# Patient Record
Sex: MALE | Race: WHITE | Employment: OTHER | URBAN - METROPOLITAN AREA
[De-identification: names, ages, dates, MRNs, and addresses within clinical notes are randomized per-mention and may not be internally consistent; named-entity substitution may affect disease eponyms.]

---

## 2017-05-17 ENCOUNTER — TELEPHONE (OUTPATIENT)
Dept: SURGERY | Facility: CLINIC | Age: 62
End: 2017-05-17

## 2017-05-17 NOTE — TELEPHONE ENCOUNTER
Patient had hernia repair surgery with Dr. Gaviria in 2011.  Calling today c/o intermittent pain, described as hot and burning.  This has been sporadic for the past three weeks.  Denies any s/s of infection or strangulation.  He reports that there is a small bulge near the site, but that has been present since the surgery in 2011.  Patient will be set up to have a consult with Dr. Gaviria in the near future.  Patient will also go to ER if pain becomes worse or there is no relief from it.    Shanon Irizarry RN

## 2022-02-28 ENCOUNTER — OFFICE VISIT (OUTPATIENT)
Dept: SURGERY | Facility: CLINIC | Age: 67
End: 2022-02-28
Payer: MEDICARE

## 2022-02-28 VITALS
WEIGHT: 260 LBS | DIASTOLIC BLOOD PRESSURE: 78 MMHG | BODY MASS INDEX: 32.33 KG/M2 | SYSTOLIC BLOOD PRESSURE: 132 MMHG | HEIGHT: 75 IN

## 2022-02-28 DIAGNOSIS — R10.31 RLQ ABDOMINAL PAIN: ICD-10-CM

## 2022-02-28 DIAGNOSIS — R10.84 ABDOMINAL PAIN, GENERALIZED: Primary | ICD-10-CM

## 2022-02-28 PROCEDURE — 99204 OFFICE O/P NEW MOD 45 MIN: CPT | Performed by: SURGERY

## 2022-02-28 RX ORDER — IRON 18 MG
TABLET ORAL
COMMUNITY

## 2022-02-28 RX ORDER — VENLAFAXINE HYDROCHLORIDE 150 MG/1
150 TABLET, EXTENDED RELEASE ORAL DAILY
COMMUNITY

## 2022-02-28 RX ORDER — GABAPENTIN 100 MG/1
100 CAPSULE ORAL 3 TIMES DAILY
COMMUNITY

## 2022-02-28 RX ORDER — METOPROLOL SUCCINATE 25 MG/1
12.5 TABLET, EXTENDED RELEASE ORAL DAILY
COMMUNITY

## 2022-02-28 RX ORDER — DULAGLUTIDE 4.5 MG/.5ML
INJECTION, SOLUTION SUBCUTANEOUS
COMMUNITY

## 2022-02-28 NOTE — PROGRESS NOTES
"[facility]  Surgery Consultation    Primary care provider:  Viktoriya Rose [phone]    HPI: This patient is a 66-year-old gentleman known to me from prior laparoscopic incisional hernia repair in 2011 who presents out of concern for right lower quadrant abdominal pain.  He reports that in the interval since last being seen which is over a decade he has had intermittent episodes of right lower quadrant as well as left upper quadrant abdominal wall pains.  He reports that these oftentimes come on but they are relatively brief in nature and resolved without issue.  That said his most recent symptom began approximately 3 weeks ago and has been present basically all the time.  He states it does not change dramatically with activity.  There is no increase in discomfort with coughing or sneezing.  It is exacerbated when laying down in bed.  He describes it as a pressure sensation while also being somewhat numb.  He has had some constipation and hard stools which is chronic for him.  He describes the sensation also as being somewhat of a tearing sensation and rates it as 7 out of 10.  No nausea or vomiting.  Of note since his last being seen he has had significant weight loss.  He has lost somewhere between 70 to 80 pounds since last being seen by [facility].    PMH:   has a past medical history of Parkinson's disease (H).  PSH:    has no past surgical history on file.  Social History:   reports that he quit smoking about 31 years ago. His smoking use included cigarettes. He has a 15.00 pack-year smoking history. He has never used smokeless tobacco.  Family History:  family history is not on file.  Medications/Allergies: Home medications and allergies reviewed.    ROS:  The 10 point Review of Systems is negative other than noted in the HPI.    Physical Exam:  /78   Ht 1.905 m (6' 3\")   Wt 117.9 kg (260 lb)   BMI 32.50 kg/m    GENERAL: Generally appears well.  Psych: Alert and Oriented.  " Normal affect  Eyes: Sclera clear  Respiratory:  Lungs clear to ausculation bilaterally with good air excursion  Cardiovascular:  Regular Rate and Rhythm with no murmurs gallops or rubs, normal peripheral pulses  GI: Abdomen Non Distended Soft Mild tenderness to palpation RLQ in the site of his prior hernia repair in the right lower abdomen there is a visible bulge that would be consistent with a prior bridging repair in someone that has had significant weight loss as he has since the time of surgery.  Most likely represents some degree of eventration in the setting of abdominal wall laxity secondary to weight loss..  Lymphatic/Hematologic/Immune:  No femoral or cervical lymphadenopathy.  Integumentary:  No rashes  Neurological: grossly intact     All new lab and imaging data was reviewed.     Impression and Plan:  Patient is a 66 year old male with right lower quadrant and left upper quadrant abdominal wall pain of unclear etiology.  Prior history of bridging laparoscopic ventral hernia repair with likely eventration    PLAN: I discussed with him his management options.  He was made aware that his mesh that was utilized at the time of surgery was voluntarily recalled from the market in 2016.  Unclear at this time if any of his issues or symptoms are related to the mesh product or even related at all to the prior history of hernia repair.  I discussed with him that there are various options that would be in place to repair the bulging but that it would not be a guarantee of symptomatic relief.  To get a better sense of his anatomy of going to send him for CT scan of the abdomen and pelvis.  Pending the outcome of the imaging additional recommendations may follow.  He was amenable to this plan.    Thank you very much for this consult.    Taras Gaviria M.D.  [facility]  [phone]    Please route or send letter to:  Primary Care Provider (PCP) and Referring Provider

## 2022-02-28 NOTE — LETTER
February 28, 2022        Viktoriya Rose NP        RE:   Isidro Schneider 1955      Dear Colleague,    Thank you for referring your patient, Isidro Schneider, to [facility]. Please see a copy of my visit note below.     This patient is a 66-year-old gentleman known to me from prior laparoscopic incisional hernia repair in 2011 who presents out of concern for right lower quadrant abdominal pain.  He reports that in the interval since last being seen which is over a decade he has had intermittent episodes of right lower quadrant as well as left upper quadrant abdominal wall pains.  He reports that these oftentimes come on but they are relatively brief in nature and resolved without issue.  That said his most recent symptom began approximately 3 weeks ago and has been present basically all the time.  He states it does not change dramatically with activity.  There is no increase in discomfort with coughing or sneezing.  It is exacerbated when laying down in bed.  He describes it as a pressure sensation while also being somewhat numb.  He has had some constipation and hard stools which is chronic for him.  He describes the sensation also as being somewhat of a tearing sensation and rates it as 7 out of 10.  No nausea or vomiting.  Of note since his last being seen he has had significant weight loss.  He has lost somewhere between 70 to 80 pounds since last being seen by [facility].     PMH:   has a past medical history of Parkinson's disease (H).  PSH:    has no past surgical history on file.  Social History:   reports that he quit smoking about 31 years ago. His smoking use included cigarettes. He has a 15.00 pack-year smoking history. He has never used smokeless tobacco.  Family History:  family history is not on file.  Medications/Allergies: Home medications and allergies reviewed.     ROS:  The 10 point Review of Systems is negative other than noted in the HPI.     Physical  "Exam:  /78   Ht 1.905 m (6' 3\")   Wt 117.9 kg (260 lb)   BMI 32.50 kg/m    GENERAL: Generally appears well.  Psych: Alert and Oriented.  Normal affect  Eyes: Sclera clear  Respiratory:  Lungs clear to ausculation bilaterally with good air excursion  Cardiovascular:  Regular Rate and Rhythm with no murmurs gallops or rubs, normal peripheral pulses  GI: Abdomen Non Distended Soft Mild tenderness to palpation RLQ in the site of his prior hernia repair in the right lower abdomen there is a visible bulge that would be consistent with a prior bridging repair in someone that has had significant weight loss as he has since the time of surgery.  Most likely represents some degree of eventration in the setting of abdominal wall laxity secondary to weight loss..  Lymphatic/Hematologic/Immune:  No femoral or cervical lymphadenopathy.  Integumentary:  No rashes  Neurological: grossly intact      All new lab and imaging data was reviewed.      Impression and Plan:  Patient is a 66 year old male with right lower quadrant and left upper quadrant abdominal wall pain of unclear etiology.  Prior history of bridging laparoscopic ventral hernia repair with likely eventration     PLAN: I discussed with him his management options.  He was made aware that his mesh that was utilized at the time of surgery was voluntarily recalled from the market in 2016.  Unclear at this time if any of his issues or symptoms are related to the mesh product or even related at all to the prior history of hernia repair.  I discussed with him that there are various options that would be in place to repair the bulging but that it would not be a guarantee of symptomatic relief.  To get a better sense of his anatomy of going to send him for CT scan of the abdomen and pelvis.  Pending the outcome of the imaging additional recommendations may follow.  He was amenable to this plan.       Again, thank you for allowing me to participate in the care of your " patient.      Sincerely,      Taras Gaviria MD

## 2022-03-01 ENCOUNTER — HOSPITAL ENCOUNTER (OUTPATIENT)
Dept: CT IMAGING | Facility: CLINIC | Age: 67
Discharge: HOME | End: 2022-03-01
Attending: SURGERY | Admitting: SURGERY
Payer: MEDICARE

## 2022-03-01 DIAGNOSIS — R10.31 RLQ ABDOMINAL PAIN: ICD-10-CM

## 2022-03-01 PROCEDURE — G1004 CDSM NDSC: HCPCS

## 2022-03-03 NOTE — RESULT ENCOUNTER NOTE
I called patient with results.  He will call our office to schedule an appointment with Dr. Gaviria if he would like to discuss further or is interested in proceeding with an open repair.      Gracia Rankin RN, BAN

## 2022-03-03 NOTE — RESULT ENCOUNTER NOTE
Result reviewed, result normal or expected, please call patient    I have personally reviewed these images.  I would say that the results are as expected.  There is a visible eventration at the site of the old hernia.  This is not a true hernia despite the radiologist interpretation.  Ultimately if the patient desired we could proceed with open repair of this.  If he wishes to discuss further please have him come back to the office.